# Patient Record
Sex: FEMALE | Race: WHITE | NOT HISPANIC OR LATINO | Employment: STUDENT | URBAN - METROPOLITAN AREA
[De-identification: names, ages, dates, MRNs, and addresses within clinical notes are randomized per-mention and may not be internally consistent; named-entity substitution may affect disease eponyms.]

---

## 2024-06-17 ENCOUNTER — TELEPHONE (OUTPATIENT)
Dept: PRIMARY CARE | Facility: CLINIC | Age: 19
End: 2024-06-17

## 2024-06-17 ENCOUNTER — APPOINTMENT (OUTPATIENT)
Dept: PRIMARY CARE | Facility: CLINIC | Age: 19
End: 2024-06-17
Payer: COMMERCIAL

## 2024-06-17 VITALS
HEIGHT: 62 IN | BODY MASS INDEX: 23.19 KG/M2 | WEIGHT: 126 LBS | OXYGEN SATURATION: 99 % | SYSTOLIC BLOOD PRESSURE: 118 MMHG | TEMPERATURE: 98 F | DIASTOLIC BLOOD PRESSURE: 74 MMHG | HEART RATE: 61 BPM

## 2024-06-17 DIAGNOSIS — J45.20 MILD INTERMITTENT ASTHMA WITHOUT COMPLICATION (HHS-HCC): ICD-10-CM

## 2024-06-17 DIAGNOSIS — S06.0X0A CONCUSSION WITHOUT LOSS OF CONSCIOUSNESS, INITIAL ENCOUNTER: Primary | ICD-10-CM

## 2024-06-17 DIAGNOSIS — G44.319 ACUTE POST-TRAUMATIC HEADACHE, NOT INTRACTABLE: ICD-10-CM

## 2024-06-17 PROCEDURE — 99204 OFFICE O/P NEW MOD 45 MIN: CPT | Performed by: INTERNAL MEDICINE

## 2024-06-17 PROCEDURE — 1036F TOBACCO NON-USER: CPT | Performed by: INTERNAL MEDICINE

## 2024-06-17 RX ORDER — SUMATRIPTAN 50 MG/1
50 TABLET, FILM COATED ORAL ONCE AS NEEDED
Qty: 27 TABLET | Refills: 3 | Status: SHIPPED | OUTPATIENT
Start: 2024-06-17 | End: 2025-06-17

## 2024-06-17 ASSESSMENT — ENCOUNTER SYMPTOMS
POLYDIPSIA: 0
NUMBNESS: 0
VOICE CHANGE: 0
PHOTOPHOBIA: 0
SINUS PAIN: 0
WOUND: 0
FREQUENCY: 0
UNEXPECTED WEIGHT CHANGE: 0
JOINT SWELLING: 0
APPETITE CHANGE: 0
FLANK PAIN: 0
BLOOD IN STOOL: 0
CONFUSION: 0
NECK PAIN: 0
ADENOPATHY: 0
COLOR CHANGE: 0
ACTIVITY CHANGE: 0
STRIDOR: 0
POLYPHAGIA: 0
DIZZINESS: 0
CHEST TIGHTNESS: 0
FACIAL ASYMMETRY: 0
BACK PAIN: 0
TROUBLE SWALLOWING: 0
VOMITING: 0
FEVER: 0
SLEEP DISTURBANCE: 0
EYE PAIN: 0
ABDOMINAL PAIN: 0
DYSURIA: 0
TREMORS: 0
SEIZURES: 0
HALLUCINATIONS: 0
PALPITATIONS: 0
SPEECH DIFFICULTY: 0
CONSTIPATION: 0
WHEEZING: 0
SHORTNESS OF BREATH: 0
HEADACHES: 0
MYALGIAS: 0
HEMATURIA: 0
WEAKNESS: 0
NERVOUS/ANXIOUS: 0
ARTHRALGIAS: 0
NAUSEA: 0
FATIGUE: 0
SORE THROAT: 0
COUGH: 0
DIARRHEA: 0

## 2024-06-17 NOTE — TELEPHONE ENCOUNTER
Pt's mom Janina called stating they went to pick prescription up but pharmacy only gave them 9 pills due to insurance coverage. Mom gave insurance a call and insurance is requiring a PA to get the rest of prescription.     Insurance PA number: [phone]  Mom Number: [phone]  (Pt's phone is not working have to contact mom)

## 2024-06-17 NOTE — PATIENT INSTRUCTIONS
Physical exam is not suggestive of Nystagmus or any other motor neurological deficit.  Patient has been given excuse until   DO NOT INVOLVE IN ANY CONTACT SPORTS. 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 BE CAREFUL WITH BUMPS ON THE ROADS.  Avoid sudden acceleration or deceleration as much as possible.  Do not work at height to avoid the risk of fall.  Avoid Alcohol/ Smoking/ Recreational drugs.  Avoid loud noises and bright fluctuating lights.     I will see you again in 4 weeks to see the fitness level to return back to work.

## 2024-06-17 NOTE — PROGRESS NOTES
Subjective   Patient ID: Sandra Pritchett is a 18 y.o. female who presents for Concussion (Hit head on cabinet at work. ).    HPI   Patient hit her head on the open cabinet while grabbing things and suddenly got up and hit her head on the open cabinet door. Incident happened couple of days ago and since then she had headache and sensitivity to loud noise, light etc.  She hit head on the flower pot hanging from the ceiling after the incident at her home.  No LOC either time. She did not get the CT scan of brain.    Her symptoms are improving gradually.    Review of Systems   Constitutional:  Negative for activity change, appetite change, fatigue, fever and unexpected weight change.   HENT:  Negative for dental problem, ear discharge, hearing loss, nosebleeds, postnasal drip, sinus pain, sore throat, trouble swallowing and voice change.    Eyes:  Negative for photophobia, pain and visual disturbance.   Respiratory:  Negative for cough, chest tightness, shortness of breath, wheezing and stridor.    Cardiovascular:  Negative for chest pain, palpitations and leg swelling.   Gastrointestinal:  Negative for abdominal pain, blood in stool, constipation, diarrhea, nausea and vomiting.   Endocrine: Negative for polydipsia, polyphagia and polyuria.   Genitourinary:  Negative for decreased urine volume, dyspareunia, dysuria, flank pain, frequency, hematuria and urgency.   Musculoskeletal:  Negative for arthralgias, back pain, gait problem, joint swelling, myalgias and neck pain.   Skin:  Negative for color change, rash and wound.   Allergic/Immunologic: Negative for environmental allergies and food allergies.   Neurological:  Negative for dizziness, tremors, seizures, syncope, facial asymmetry, speech difficulty, weakness, numbness and headaches.   Hematological:  Negative for adenopathy.   Psychiatric/Behavioral:  Negative for behavioral problems, confusion, hallucinations, self-injury, sleep disturbance and suicidal ideas. The  "patient is not nervous/anxious.      Objective   /74   Pulse 61   Temp 36.7 °C (98 °F)   Ht 1.575 m (5' 2\")   Wt 57.2 kg (126 lb)   SpO2 99%   BMI 23.05 kg/m²     Physical Exam  Vitals and nursing note reviewed.   Constitutional:       General: She is not in acute distress.     Appearance: Normal appearance. She is normal weight. She is not ill-appearing or toxic-appearing.   HENT:      Head: Normocephalic and atraumatic.      Nose: Nose normal.   Eyes:      Comments: She is wearing sun glasses to avoid light. I did not perform eye examination.    Cardiovascular:      Rate and Rhythm: Normal rate and regular rhythm.      Pulses: Normal pulses.      Heart sounds: Normal heart sounds. No murmur heard.     No gallop.   Pulmonary:      Effort: Pulmonary effort is normal. No respiratory distress.      Breath sounds: Normal breath sounds. No stridor. No wheezing or rales.   Musculoskeletal:         General: No swelling or deformity. Normal range of motion.      Cervical back: Normal range of motion and neck supple. No rigidity or tenderness.   Skin:     General: Skin is warm.      Coloration: Skin is not jaundiced.      Findings: No bruising, erythema or rash.   Neurological:      General: No focal deficit present.      Mental Status: She is alert and oriented to person, place, and time.      GCS: GCS eye subscore is 4. GCS verbal subscore is 5. GCS motor subscore is 6.      Cranial Nerves: No cranial nerve deficit, dysarthria or facial asymmetry.      Motor: Motor function is intact.      Coordination: Coordination is intact.      Gait: Gait normal.      Deep Tendon Reflexes:      Reflex Scores:       Bicep reflexes are 2+ on the right side and 2+ on the left side.       Brachioradialis reflexes are 2+ on the right side and 2+ on the left side.       Patellar reflexes are 2+ on the right side and 2+ on the left side.  Psychiatric:         Mood and Affect: Mood normal.         Behavior: Behavior normal.        "  Thought Content: Thought content normal.         Judgment: Judgment normal.       Assessment/Plan   Problem List Items Addressed This Visit          Neuro    Concussion with no loss of consciousness - Primary     Physical exam is not suggestive of Nystagmus or any other motor neurological deficit.  Patient has been given excuse until   DO NOT INVOLVE IN ANY CONTACT SPORTS. 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 BE CAREFUL WITH BUMPS ON THE ROADS.  Avoid sudden acceleration or deceleration as much as possible.  Do not work at height to avoid the risk of fall.  Avoid Alcohol/ Smoking/ Recreational drugs.  Avoid loud noises and bright fluctuating lights.     I will see you again in 4 weeks to see the fitness level to return back to work.            Pulmonary and Pneumonias    Mild intermittent asthma without complication ([facility])     Asymptomatic.  No wheezing on auscultation.          Other Visit Diagnoses       Acute post-traumatic headache, not intractable        Relevant Medications    SUMAtriptan (Imitrex) 50 mg tablet          RTC in 4 weeks for follow up. Concussion instruction has been provided to the patient.

## 2024-06-17 NOTE — LETTER
June 17, 2024     Patient: Sandra Pritchett   YOB: 2005   Date of Visit: 6/17/2024       To Whom It May Concern:    Sandra Pritchett was seen in my clinic on 6/17/2024 at 10:00 am. 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 Please appoint some other person to do this job for at least 4 weeks. It will going to exacerbate her medical problem.    If you have any questions or concerns, please don't hesitate to call.         Sincerely,         Monty Lacey MD        CC: No Recipients

## 2024-07-22 ENCOUNTER — APPOINTMENT (OUTPATIENT)
Dept: PRIMARY CARE | Facility: CLINIC | Age: 19
End: 2024-07-22
Payer: COMMERCIAL

## 2024-07-22 VITALS
WEIGHT: 130.4 LBS | SYSTOLIC BLOOD PRESSURE: 116 MMHG | HEART RATE: 87 BPM | TEMPERATURE: 98.2 F | OXYGEN SATURATION: 99 % | HEIGHT: 62 IN | DIASTOLIC BLOOD PRESSURE: 72 MMHG | RESPIRATION RATE: 16 BRPM | BODY MASS INDEX: 24 KG/M2

## 2024-07-22 DIAGNOSIS — G44.311 INTRACTABLE ACUTE POST-TRAUMATIC HEADACHE: Primary | ICD-10-CM

## 2024-07-22 PROCEDURE — 99213 OFFICE O/P EST LOW 20 MIN: CPT | Performed by: INTERNAL MEDICINE

## 2024-07-22 PROCEDURE — 1036F TOBACCO NON-USER: CPT | Performed by: INTERNAL MEDICINE

## 2024-07-22 PROCEDURE — 3008F BODY MASS INDEX DOCD: CPT | Performed by: INTERNAL MEDICINE

## 2024-07-22 RX ORDER — PROPRANOLOL HYDROCHLORIDE 20 MG/1
20 TABLET ORAL DAILY
Qty: 30 TABLET | Refills: 2 | Status: SHIPPED | OUTPATIENT
Start: 2024-07-22 | End: 2024-10-20

## 2024-07-22 RX ORDER — PROPRANOLOL HYDROCHLORIDE 20 MG/1
20 TABLET ORAL 2 TIMES DAILY
Qty: 60 TABLET | Refills: 5 | Status: SHIPPED | OUTPATIENT
Start: 2024-07-22 | End: 2024-07-22

## 2024-07-22 ASSESSMENT — ENCOUNTER SYMPTOMS
VOICE CHANGE: 0
VOMITING: 0
SHORTNESS OF BREATH: 0
POLYDIPSIA: 0
STRIDOR: 0
DIARRHEA: 0
TROUBLE SWALLOWING: 0
CHEST TIGHTNESS: 0
NECK PAIN: 0
JOINT SWELLING: 0
DYSURIA: 0
HEADACHES: 1
FLANK PAIN: 0
TREMORS: 0
NERVOUS/ANXIOUS: 0
MYALGIAS: 0
EYE PAIN: 0
WHEEZING: 0
SINUS PAIN: 0
ABDOMINAL PAIN: 0
POLYPHAGIA: 0
UNEXPECTED WEIGHT CHANGE: 0
HALLUCINATIONS: 0
NUMBNESS: 0
SEIZURES: 0
BACK PAIN: 0
ARTHRALGIAS: 0
SPEECH DIFFICULTY: 0
CONSTIPATION: 0
COUGH: 0
FEVER: 0
WOUND: 0
SLEEP DISTURBANCE: 0
ACTIVITY CHANGE: 0
APPETITE CHANGE: 0
SORE THROAT: 0
PHOTOPHOBIA: 0
HEMATURIA: 0
WEAKNESS: 0
FACIAL ASYMMETRY: 0
FREQUENCY: 0
PALPITATIONS: 0
DIZZINESS: 0
CONFUSION: 0
COLOR CHANGE: 0
NAUSEA: 0
BLOOD IN STOOL: 0
FATIGUE: 0
ADENOPATHY: 0

## 2024-07-22 NOTE — PROGRESS NOTES
Subjective   Patient ID: Sandra Pritchett is a 18 y.o. female who presents for Concussion (Hit head at work on a cabinet dizziness and blurry vision. Having trouble with memory ).    Concussion   Associated symptoms include headaches. Pertinent negatives include no numbness, vomiting or weakness.   Patient was given Sumatriptan for headache. She said I am hearing conflicting information about the medicine so she did not take the medicine and took only 1 dose.  As per her pharmacist at the [facility] asked not to take twice a week. Its possible it was told to her that insurance only cover 9 tablet and 2 tablets per week will be covered by the insurance.    Review of Systems   Constitutional:  Negative for activity change, appetite change, fatigue, fever and unexpected weight change.   HENT:  Negative for dental problem, ear discharge, hearing loss, nosebleeds, postnasal drip, sinus pain, sore throat, trouble swallowing and voice change.    Eyes:  Negative for photophobia, pain and visual disturbance.   Respiratory:  Negative for cough, chest tightness, shortness of breath, wheezing and stridor.    Cardiovascular:  Negative for chest pain, palpitations and leg swelling.   Gastrointestinal:  Negative for abdominal pain, blood in stool, constipation, diarrhea, nausea and vomiting.   Endocrine: Negative for polydipsia, polyphagia and polyuria.   Genitourinary:  Negative for decreased urine volume, dyspareunia, dysuria, flank pain, frequency, hematuria and urgency.   Musculoskeletal:  Negative for arthralgias, back pain, gait problem, joint swelling, myalgias and neck pain.   Skin:  Negative for color change, rash and wound.   Allergic/Immunologic: Negative for environmental allergies and food allergies.   Neurological:  Positive for headaches. Negative for dizziness, tremors, seizures, syncope, facial asymmetry, speech difficulty, weakness and numbness.   Hematological:  Negative for adenopathy.   Psychiatric/Behavioral:   "Negative for behavioral problems, confusion, hallucinations, self-injury, sleep disturbance and suicidal ideas. The patient is not nervous/anxious.      Objective   /72   Pulse 87   Temp 36.8 °C (98.2 °F) (Temporal)   Resp 16   Ht 1.575 m (5' 2\")   Wt 59.1 kg (130 lb 6.4 oz)   SpO2 99%   BMI 23.85 kg/m²     Physical Exam  Constitutional:       General: She is not in acute distress.     Appearance: She is not ill-appearing or toxic-appearing.   HENT:      Nose: Nose normal.   Eyes:      Conjunctiva/sclera: Conjunctivae normal.   Pulmonary:      Effort: Pulmonary effort is normal.   Neurological:      General: No focal deficit present.      Mental Status: She is alert and oriented to person, place, and time.   Psychiatric:         Mood and Affect: Mood normal.         Behavior: Behavior normal.       Assessment/Plan   Problem List Items Addressed This Visit    None  Visit Diagnoses       Intractable acute post-traumatic headache    -  Primary    Relevant Medications    propranolol (Inderal) 20 mg tablet        Patient did not take her medicine as advised. There is nothing much I can do here. From my side there is no acute change in the mental status and she can follow up with PCP and if has long term symptoms than Neurology can be considered. I am trying Propranolol for migraine prophylaxis.   "

## 2025-01-22 ENCOUNTER — OFFICE VISIT (OUTPATIENT)
Dept: URGENT CARE | Age: 20
End: 2025-01-22
Payer: COMMERCIAL

## 2025-01-22 VITALS
SYSTOLIC BLOOD PRESSURE: 135 MMHG | HEART RATE: 132 BPM | TEMPERATURE: 101.3 F | RESPIRATION RATE: 18 BRPM | DIASTOLIC BLOOD PRESSURE: 89 MMHG | OXYGEN SATURATION: 96 %

## 2025-01-22 DIAGNOSIS — J40 BRONCHITIS: Primary | ICD-10-CM

## 2025-01-22 RX ORDER — AZITHROMYCIN 250 MG/1
TABLET, FILM COATED ORAL
Qty: 6 TABLET | Refills: 0 | Status: SHIPPED | OUTPATIENT
Start: 2025-01-22 | End: 2025-01-27

## 2025-01-22 RX ORDER — ALBUTEROL SULFATE 90 UG/1
2 INHALANT RESPIRATORY (INHALATION) EVERY 4 HOURS PRN
Qty: 8.5 G | Refills: 0 | Status: SHIPPED | OUTPATIENT
Start: 2025-01-22 | End: 2026-01-22

## 2025-01-22 RX ORDER — PREDNISONE 20 MG/1
40 TABLET ORAL DAILY
Qty: 10 TABLET | Refills: 0 | Status: SHIPPED | OUTPATIENT
Start: 2025-01-22 | End: 2025-01-27

## 2025-01-22 ASSESSMENT — ENCOUNTER SYMPTOMS
PALPITATIONS: 0
DIARRHEA: 0
CHILLS: 1
FEVER: 1
WHEEZING: 1
CONSTIPATION: 0
COUGH: 1
TROUBLE SWALLOWING: 0
SHORTNESS OF BREATH: 0
RHINORRHEA: 0
SORE THROAT: 0
CHEST TIGHTNESS: 0
NAUSEA: 0

## 2025-01-22 NOTE — PROGRESS NOTES
Subjective   Patient ID: Sandra Pritchett is a 19 y.o. female. They present today with a chief complaint of Cough (Pt c/o persistent fatigue with increasingly severe cough and fever >1 week, pt c/o chest congestion and wet cough without sputum).    History of Present Illness  Patient presents with fever, coughing fits, productive cough, and wheezing. Claims upset stomach from coughing and side pain from forceful coughing fits. Denies diarrhea. Denies shortness of breath. Mom explains that patient had a history of asthma in childhood. Has used family members inhaler with some temp improvement of symptoms at home.           Past Medical History  Allergies as of 01/22/2025    (No Known Allergies)       (Not in a hospital admission)       No past medical history on file.    No past surgical history on file.     reports that she has never smoked. She has been exposed to tobacco smoke. She has never used smokeless tobacco. She reports that she does not drink alcohol and does not use drugs.    Review of Systems  Review of Systems   Constitutional:  Positive for chills and fever.   HENT:  Negative for congestion, ear pain, postnasal drip, rhinorrhea, sore throat and trouble swallowing.    Respiratory:  Positive for cough and wheezing. Negative for chest tightness and shortness of breath.    Cardiovascular:  Negative for palpitations.   Gastrointestinal:  Negative for constipation, diarrhea and nausea.   Skin:  Negative for rash.                                  Objective    Vitals:    01/22/25 1720   BP: 135/89   Pulse: (!) 132   Resp: 18   Temp: (!) 38.5 °C (101.3 °F)   SpO2: 96%     No LMP recorded.    Physical Exam  Constitutional:       General: She is not in acute distress.     Appearance: She is not toxic-appearing.   HENT:      Right Ear: Tympanic membrane and external ear normal.      Left Ear: Tympanic membrane and external ear normal.      Nose: No congestion.      Right Sinus: No frontal sinus tenderness.       Left Sinus: No frontal sinus tenderness.      Mouth/Throat:      Mouth: Mucous membranes are moist. No oral lesions.      Pharynx: Postnasal drip present. No oropharyngeal exudate or posterior oropharyngeal erythema.   Eyes:      Pupils: Pupils are equal, round, and reactive to light.   Cardiovascular:      Rate and Rhythm: Normal rate and regular rhythm.   Pulmonary:      Effort: Pulmonary effort is normal. No accessory muscle usage or respiratory distress.      Breath sounds: Examination of the right-upper field reveals wheezing. Examination of the left-upper field reveals wheezing. Examination of the right-middle field reveals wheezing. Examination of the left-middle field reveals wheezing. Wheezing present.      Comments: Mild expiratory wheeze noted b/l, diffuse. Cleared without coughing fit during examination. Otherwise normal resp effort at rest.   Musculoskeletal:      Cervical back: Normal range of motion.   Neurological:      Mental Status: She is alert.         Procedures    Point of Care Test & Imaging Results from this visit  No results found for this visit on 01/22/25.   No results found.    Diagnostic study results (if any) were reviewed by Elvira Salcido PA-C.    Assessment/Plan   Allergies, medications, history, and pertinent labs/EKGs/Imaging reviewed by Elvira Salcido PA-C.     Medical Decision Making  MDM- History and exam consistent with acute bronchitis. No evidence of pneumonia, sepsis or other acute cardiopulmonary pathology but has past lung disease. Based on current exam I don't feel that imaging, labs, or further work up are warranted at this point. Based on current exam and past medical history, plan is for antibiotics and symptomatic therapies. Patient advised to return to clinic or go to the ED if symptoms change or worsen. Patient verbalized understanding and agrees with plan.       Orders and Diagnoses  Diagnoses and all orders for this visit:  Bronchitis  -     azithromycin  (Zithromax) 250 mg tablet; Take 2 tabs (500 mg) by mouth today, than 1 daily for 4 days.  -     predniSONE (Deltasone) 20 mg tablet; Take 2 tablets (40 mg) by mouth once daily for 5 days.  -     albuterol (ProAir HFA) 90 mcg/actuation inhaler; Inhale 2 puffs every 4 hours if needed for wheezing or shortness of breath.      Medical Admin Record      Patient disposition: Home    Electronically signed by Elvira Salcido PA-C  5:28 PM

## 2025-01-22 NOTE — PATIENT INSTRUCTIONS
Recommended Salt water gargles, hot tea and honey, tylenol/ibuprofen,    Keep treating fever with tylenol ibuprofen    Recommended mucinex DM over the counter.     Follow up with pcp.      If you develop chest pain, shortness of breath, or fever not reduced with tylenol/ibuprofen go to ER

## 2025-01-22 NOTE — LETTER
January 22, 2025     Patient: Sandra Pritchett   YOB: 2005   Date of Visit: 1/22/2025       To Whom It May Concern:    Sandra Pritchett was seen in my clinic on 1/22/2025 at 5:10 pm. Please excuse Sandra for her absence from work. May return when symptoms improved and fever free for 24-48 hours without use of tylenol/ibuprofen.          Sincerely,         Elvira Salcido PA-C        CC: No Recipients

## 2025-03-07 ENCOUNTER — OFFICE VISIT (OUTPATIENT)
Dept: PRIMARY CARE | Facility: CLINIC | Age: 20
End: 2025-03-07
Payer: COMMERCIAL

## 2025-03-07 VITALS
OXYGEN SATURATION: 99 % | TEMPERATURE: 97.3 F | SYSTOLIC BLOOD PRESSURE: 116 MMHG | BODY MASS INDEX: 25.21 KG/M2 | HEIGHT: 62 IN | HEART RATE: 78 BPM | WEIGHT: 137 LBS | DIASTOLIC BLOOD PRESSURE: 70 MMHG

## 2025-03-07 DIAGNOSIS — G43.009 MIGRAINE WITHOUT AURA AND WITHOUT STATUS MIGRAINOSUS, NOT INTRACTABLE: Primary | ICD-10-CM

## 2025-03-07 PROCEDURE — 3008F BODY MASS INDEX DOCD: CPT | Performed by: INTERNAL MEDICINE

## 2025-03-07 PROCEDURE — 99213 OFFICE O/P EST LOW 20 MIN: CPT | Performed by: INTERNAL MEDICINE

## 2025-03-07 PROCEDURE — 1036F TOBACCO NON-USER: CPT | Performed by: INTERNAL MEDICINE

## 2025-03-07 NOTE — PROGRESS NOTES
"Subjective   Patient ID: Sandra Pritchett is a 19 y.o. female who presents for Migraine (Chronic migraines).    HPI   Patient migraine is still present.  She had good response with Imitrex but insurance covers only 9 tablets a month and she is willing to try new medicine.    Review of Systems   Constitutional:  Negative for activity change, appetite change, fatigue, fever and unexpected weight change.   HENT:  Negative for ear discharge, hearing loss, nosebleeds, postnasal drip, sinus pain, sore throat, trouble swallowing and voice change.    Eyes:  Negative for photophobia, pain and visual disturbance.   Respiratory:  Negative for cough, chest tightness, shortness of breath, wheezing and stridor.    Cardiovascular:  Negative for chest pain, palpitations and leg swelling.   Gastrointestinal:  Negative for abdominal pain, blood in stool, constipation, diarrhea and nausea.   Endocrine: Negative for polyuria.   Genitourinary:  Negative for decreased urine volume, dyspareunia, dysuria, flank pain, hematuria and urgency.   Musculoskeletal:  Negative for arthralgias, back pain, gait problem, joint swelling, myalgias and neck pain.   Skin:  Negative for color change and rash.   Allergic/Immunologic: Negative for environmental allergies and food allergies.   Neurological:  Positive for headaches. Negative for dizziness, tremors, seizures, syncope, weakness and numbness.   Hematological:  Negative for adenopathy.   Psychiatric/Behavioral:  Negative for sleep disturbance. The patient is not nervous/anxious.      Objective   /70   Pulse 78   Temp 36.3 °C (97.3 °F)   Ht 1.575 m (5' 2\")   Wt 62.1 kg (137 lb)   SpO2 99%   BMI 25.06 kg/m²     Physical Exam  Vitals and nursing note reviewed.   Constitutional:       General: She is not in acute distress.     Appearance: Normal appearance. She is not ill-appearing or toxic-appearing.   HENT:      Head: Normocephalic.      Nose: Nose normal. No congestion.   Eyes:      " Conjunctiva/sclera: Conjunctivae normal.   Pulmonary:      Effort: Pulmonary effort is normal.   Musculoskeletal:         General: No deformity. Normal range of motion.   Neurological:      General: No focal deficit present.      Mental Status: She is alert and oriented to person, place, and time.   Psychiatric:         Mood and Affect: Mood normal.         Behavior: Behavior normal.       Assessment/Plan   Problem List Items Addressed This Visit    None  Visit Diagnoses       Migraine without aura and without status migrainosus, not intractable    -  Primary    Relevant Medications    rimegepant (NURTEC) 75 mg tablet,disintegrating           I have personally seen and examined this patient.  I have fully participated in the care of this patient. I have reviewed all pertinent clinical information, including history, physical exam, plan and the Resident’s note and agree except as noted.

## 2025-03-09 ASSESSMENT — ENCOUNTER SYMPTOMS
COLOR CHANGE: 0
BACK PAIN: 0
TROUBLE SWALLOWING: 0
SORE THROAT: 0
SINUS PAIN: 0
ACTIVITY CHANGE: 0
CHEST TIGHTNESS: 0
DIZZINESS: 0
JOINT SWELLING: 0
FATIGUE: 0
NECK PAIN: 0
EYE PAIN: 0
PHOTOPHOBIA: 0
SLEEP DISTURBANCE: 0
BLOOD IN STOOL: 0
SHORTNESS OF BREATH: 0
ABDOMINAL PAIN: 0
ADENOPATHY: 0
DYSURIA: 0
FEVER: 0
NAUSEA: 0
DIARRHEA: 0
ARTHRALGIAS: 0
MYALGIAS: 0
HEMATURIA: 0
NUMBNESS: 0
PALPITATIONS: 0
WHEEZING: 0
UNEXPECTED WEIGHT CHANGE: 0
CONSTIPATION: 0
HEADACHES: 1
APPETITE CHANGE: 0
VOICE CHANGE: 0
NERVOUS/ANXIOUS: 0
WEAKNESS: 0
TREMORS: 0
STRIDOR: 0
COUGH: 0
SEIZURES: 0
FLANK PAIN: 0

## 2025-04-01 ENCOUNTER — APPOINTMENT (OUTPATIENT)
Dept: PRIMARY CARE | Facility: CLINIC | Age: 20
End: 2025-04-01
Payer: COMMERCIAL

## 2025-04-17 ENCOUNTER — TELEPHONE (OUTPATIENT)
Dept: PRIMARY CARE | Facility: CLINIC | Age: 20
End: 2025-04-17

## 2025-04-17 DIAGNOSIS — G43.009 MIGRAINE WITHOUT AURA AND WITHOUT STATUS MIGRAINOSUS, NOT INTRACTABLE: Primary | ICD-10-CM

## 2025-04-17 DIAGNOSIS — G43.009 MIGRAINE WITHOUT AURA AND WITHOUT STATUS MIGRAINOSUS, NOT INTRACTABLE: ICD-10-CM

## 2025-04-17 NOTE — PROGRESS NOTES
"Subjective   Patient ID: Sandra Pritchett \"Masha" is a 19 y.o. female who presents for New Patient Visit and Annual Exam.  Today she is accompanied by alone.     HPI  New patient visit/ Health maintenance  Sandra presents today as a new patient,  Overall patient is doing well, but admits of having some concerns as noted below.   Immunization: Tdap 2014  Influenza vaccine declined   COVID-19 vaccine (Pfizer Moderna) 3 doses:  Colon Cancer Screening: No family history  OB/GYN: Pap smear is due  Diet: Regular diet  Exercise: Plays softball  Tobacco: Denies use  EtOH: Rarely Socially     Other problems/diagnoses addressed and reviewed during this encounter    2. Migraine  Has history of postconcussion headaches, started in June 2024.  Her headaches are associated with aura, visual disturbance and frequency is inconsistent, varying from every day to period of weeks without any headaches.  Most recently while she is on prophylactic Nurtec 75 mg, she has been experiencing those headaches every other day.  In the past has used Imitrex and propranolol  This medication is managed by Dr. Lacey.  She has not followed with neurology in the past and is asking for referral    3. Asthma  He has a history of mild exertional asthma  Uses albuterol prior to exertion  Denies any wheezing or shortness of breath    4.  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  Admits that her sleep pattern is not the greatest that sometimes she experiencing problem falling asleep and staying asleep.  Also has noticed that she is more irritable recently and feels worried most of the time.  At times she has palpitation and feels like her heart is racing.  Complain that her mind is racing and it is harder for her to relax.      Medications Ordered Prior to Encounter[1]     Allergies[2]    Immunization History   Administered Date(s) Administered    COVID-19, mRNA, LNP-S, PF, 30 " "mcg/0.3 mL dose 07/25/2021, 08/16/2021    DTP 11/19/2004    DTaP, Unspecified 2003, 2003, 02/06/2004, 08/19/2008    Flu vaccine (IIV4), preservative free *Check age/dose* 10/02/2017, 12/28/2018, 11/25/2019    HPV, Quadrivalent 08/07/2014, 10/08/2014    HPV, Unspecified 02/16/2015    Hepatitis A vaccine, pediatric/adolescent (HAVRIX, VAQTA) 09/08/2011, 04/11/2012    Hepatitis B vaccine, 19 yrs and under (RECOMBIVAX, ENGERIX) 2003, 2003, 2003, 02/06/2004    Hib (HbOC) 2003, 2003, 02/06/2004, 09/11/2007    Influenza, seasonal, injectable 12/11/2007    MMR vaccine, subcutaneous (MMR II) 08/02/2004, 08/19/2008    Meningococcal ACWY-D (Menactra) 4-valent conjugate vaccine 08/07/2014, 08/09/2019    Meningococcal B vaccine (BEXSERO) 08/20/2021, 11/24/2021    Pfizer Gray Cap SARS-CoV-2 01/28/2022    Pneumococcal Conjugate PCV 7 2003, 2003, 02/06/2004, 11/19/2004    Pneumococcal, Unspecified 11/19/2004    Poliovirus vaccine, subcutaneous (IPOL) 2003, 2003, 02/06/2004, 08/19/2008    Tdap vaccine, age 7 year and older (BOOSTRIX, ADACEL) 08/11/2014, 04/21/2025    Varicella vaccine, subcutaneous (VARIVAX) 08/02/2004, 08/19/2008         Review of Systems  All pertinent positive symptoms are included in the history of present illness.  All other systems have been reviewed and are negative and noncontributory to this patient's current ailments.     Objective   /70 (BP Location: Left arm, Patient Position: Sitting, BP Cuff Size: Adult)   Pulse 85   Ht 1.575 m (5' 2\")   Wt 63 kg (138 lb 12.8 oz)   SpO2 100%   BMI 25.39 kg/m²   BSA: 1.66 meters squared  No visits with results within 1 Month(s) from this visit.   Latest known visit with results is:   [facility] Encounter on 03/03/2022   Component Date Value Ref Range Status    Ventricular Rate 03/03/2022 61  BPM Final    Atrial Rate 03/03/2022 61  BPM Final    FL Interval 03/03/2022 142  ms Final    QRS Duration " 03/03/2022 78  ms Final    QT Interval 03/03/2022 388  ms Final    QTC Calculation(Bazett) 03/03/2022 390  ms Final    P Axis 03/03/2022 51  degrees Final    R Axis 03/03/2022 66  degrees Final    T Axis 03/03/2022 43  degrees Final    QRS Count 03/03/2022 10  beats Final    Q Onset 03/03/2022 220  ms Final    P Onset 03/03/2022 149  ms Final    P Offset 03/03/2022 195  ms Final    T Offset 03/03/2022 414  ms Final    QTC Fredericia 03/03/2022 389  ms Final       Physical Exam  Constitutional:       General: She is not in acute distress.     Appearance: Normal appearance. She is normal weight. She is not ill-appearing.   HENT:      Head: Normocephalic and atraumatic.      Right Ear: External ear normal.      Left Ear: External ear normal.      Nose: Nose normal. No congestion or rhinorrhea.      Mouth/Throat:      Mouth: Mucous membranes are moist.      Pharynx: Oropharynx is clear. No oropharyngeal exudate or posterior oropharyngeal erythema.   Eyes:      General: No scleral icterus.     Extraocular Movements: Extraocular movements intact.      Conjunctiva/sclera: Conjunctivae normal.      Pupils: Pupils are equal, round, and reactive to light.   Cardiovascular:      Rate and Rhythm: Normal rate and regular rhythm.      Pulses: Normal pulses.      Heart sounds: Normal heart sounds. No murmur heard.  Pulmonary:      Effort: Pulmonary effort is normal. No respiratory distress.      Breath sounds: Normal breath sounds. No wheezing, rhonchi or rales.   Abdominal:      General: Abdomen is flat. Bowel sounds are normal.      Palpations: Abdomen is soft.      Tenderness: There is no abdominal tenderness. There is no guarding or rebound.   Musculoskeletal:         General: No deformity. Normal range of motion.      Right lower leg: No edema.      Left lower leg: No edema.   Skin:     General: Skin is warm and dry.      Capillary Refill: Capillary refill takes less than 2 seconds.      Findings: No lesion or rash.    Neurological:      General: No focal deficit present.      Mental Status: She is alert and oriented to person, place, and time. Mental status is at baseline.   Psychiatric:         Mood and Affect: Mood normal.         Behavior: Behavior normal.         Thought Content: Thought content normal.         Judgment: Judgment normal.      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  We discussed the benefits and side effects of the immunization  All questions were answered and the vaccine was administered.     Migraine without aura and without status migrainosus, not intractable  -     Referral to Neurology; Future  -     venlafaxine XR (Effexor-XR) 37.5 mg 24 hr capsule; Take 1 capsule (37.5 mg) by mouth once daily. Do not crush or chew.    Mild intermittent asthma without complication ([facility])  -     albuterol (ProAir HFA) 90 mcg/actuation inhaler; Inhale 2 puffs every 4 hours if needed for wheezing or shortness of breath.    Anxiety  -     hydrOXYzine pamoate (VistariL) 25 mg capsule; Take 1 capsule (25 mg) by mouth 3 times a day as needed for itching for up to 10 days.  -     venlafaxine XR (Effexor-XR) 37.5 mg 24 hr capsule; Take 1 capsule  (37.5 mg) by mouth once daily. Do not crush or chew.    Screening for thyroid disorder  -     TSH with reflex to Free T4 if abnormal; Future  Screening for cardiovascular condition  -     Lipid Panel; Future  Screening for blood disease  -     CBC and Auto Differential; Future  Screening for diabetes mellitus  -     Hemoglobin A1C; Future  Screening for tuberculosis  -     T-Spot TB; Future      Thank you for letting us be a part of your care team.  Please call the office if you have further questions or concerns regarding your care    Otherwise, please follow-up in 6 weeks for continued care and refills     Felicitas Mondragon MD  PGY2, FM Resident        [1]   Current Outpatient Medications on File Prior to Visit   Medication Sig Dispense Refill    rimegepant (NURTEC) 75 mg tablet,disintegrating Dissolve 1 tablet (75 mg) in the mouth every other day. 15 tablet 1    [DISCONTINUED] albuterol (ProAir HFA) 90 mcg/actuation inhaler Inhale 2 puffs every 4 hours if needed for wheezing or shortness of breath. 8.5 g 0    [DISCONTINUED] propranolol (Inderal) 20 mg tablet Take 1 tablet (20 mg) by mouth once daily. 30 tablet 2    [DISCONTINUED] SUMAtriptan (Imitrex) 50 mg tablet Take 1 tablet (50 mg) by mouth 1 time if needed for migraine. May repeat after 2 hours. 27 tablet 3     No current facility-administered medications on file prior to visit.   [2] No Known Allergies

## 2025-04-17 NOTE — TELEPHONE ENCOUNTER
Pt and pharmacy has called multiple time for refill. Are you able to send in?  Rimegepant to [facility]

## 2025-04-19 DIAGNOSIS — G43.009 MIGRAINE WITHOUT AURA AND WITHOUT STATUS MIGRAINOSUS, NOT INTRACTABLE: ICD-10-CM

## 2025-04-21 ENCOUNTER — APPOINTMENT (OUTPATIENT)
Dept: PRIMARY CARE | Facility: CLINIC | Age: 20
End: 2025-04-21
Payer: COMMERCIAL

## 2025-04-21 VITALS
HEIGHT: 62 IN | SYSTOLIC BLOOD PRESSURE: 128 MMHG | WEIGHT: 138.8 LBS | DIASTOLIC BLOOD PRESSURE: 70 MMHG | OXYGEN SATURATION: 100 % | HEART RATE: 85 BPM | BODY MASS INDEX: 25.54 KG/M2

## 2025-04-21 DIAGNOSIS — Z13.0 SCREENING FOR BLOOD DISEASE: ICD-10-CM

## 2025-04-21 DIAGNOSIS — Z00.00 HEALTHCARE MAINTENANCE: Primary | ICD-10-CM

## 2025-04-21 DIAGNOSIS — Z13.6 SCREENING FOR CARDIOVASCULAR CONDITION: ICD-10-CM

## 2025-04-21 DIAGNOSIS — G43.009 MIGRAINE WITHOUT AURA AND WITHOUT STATUS MIGRAINOSUS, NOT INTRACTABLE: ICD-10-CM

## 2025-04-21 DIAGNOSIS — F41.9 ANXIETY: ICD-10-CM

## 2025-04-21 DIAGNOSIS — Z23 NEED FOR TDAP VACCINATION: ICD-10-CM

## 2025-04-21 DIAGNOSIS — J40 BRONCHITIS: ICD-10-CM

## 2025-04-21 DIAGNOSIS — Z13.29 SCREENING FOR THYROID DISORDER: ICD-10-CM

## 2025-04-21 DIAGNOSIS — Z13.1 SCREENING FOR DIABETES MELLITUS: ICD-10-CM

## 2025-04-21 DIAGNOSIS — J45.20 MILD INTERMITTENT ASTHMA WITHOUT COMPLICATION (HHS-HCC): ICD-10-CM

## 2025-04-21 DIAGNOSIS — Z11.1 SCREENING FOR TUBERCULOSIS: ICD-10-CM

## 2025-04-21 PROCEDURE — 90715 TDAP VACCINE 7 YRS/> IM: CPT | Performed by: STUDENT IN AN ORGANIZED HEALTH CARE EDUCATION/TRAINING PROGRAM

## 2025-04-21 PROCEDURE — 1036F TOBACCO NON-USER: CPT | Performed by: STUDENT IN AN ORGANIZED HEALTH CARE EDUCATION/TRAINING PROGRAM

## 2025-04-21 PROCEDURE — 99214 OFFICE O/P EST MOD 30 MIN: CPT | Performed by: STUDENT IN AN ORGANIZED HEALTH CARE EDUCATION/TRAINING PROGRAM

## 2025-04-21 PROCEDURE — 90471 IMMUNIZATION ADMIN: CPT | Performed by: STUDENT IN AN ORGANIZED HEALTH CARE EDUCATION/TRAINING PROGRAM

## 2025-04-21 PROCEDURE — 99395 PREV VISIT EST AGE 18-39: CPT | Performed by: STUDENT IN AN ORGANIZED HEALTH CARE EDUCATION/TRAINING PROGRAM

## 2025-04-21 PROCEDURE — 3008F BODY MASS INDEX DOCD: CPT | Performed by: STUDENT IN AN ORGANIZED HEALTH CARE EDUCATION/TRAINING PROGRAM

## 2025-04-21 RX ORDER — VENLAFAXINE HYDROCHLORIDE 37.5 MG/1
37.5 CAPSULE, EXTENDED RELEASE ORAL DAILY
Qty: 30 CAPSULE | Refills: 1 | Status: SHIPPED | OUTPATIENT
Start: 2025-04-21 | End: 2025-06-20

## 2025-04-21 RX ORDER — HYDROXYZINE PAMOATE 25 MG/1
25 CAPSULE ORAL 3 TIMES DAILY PRN
Qty: 30 CAPSULE | Refills: 1 | Status: SHIPPED | OUTPATIENT
Start: 2025-04-21 | End: 2025-05-01

## 2025-04-21 RX ORDER — ALBUTEROL SULFATE 90 UG/1
2 INHALANT RESPIRATORY (INHALATION) EVERY 4 HOURS PRN
Qty: 8.5 G | Refills: 1 | Status: SHIPPED | OUTPATIENT
Start: 2025-04-21 | End: 2026-04-21

## 2025-04-21 RX ORDER — CITALOPRAM 10 MG/1
TABLET ORAL
Qty: 30 TABLET | Refills: 1 | Status: CANCELLED | OUTPATIENT
Start: 2025-04-21 | End: 2025-06-27

## 2025-04-22 RX ORDER — RIMEGEPANT SULFATE 75 MG/75MG
TABLET, ORALLY DISINTEGRATING ORAL
Qty: 15 TABLET | Refills: 0 | Status: SHIPPED | OUTPATIENT
Start: 2025-04-22

## 2025-05-13 ENCOUNTER — ANCILLARY PROCEDURE (OUTPATIENT)
Dept: URGENT CARE | Age: 20
End: 2025-05-13
Payer: COMMERCIAL

## 2025-05-13 ENCOUNTER — OFFICE VISIT (OUTPATIENT)
Dept: URGENT CARE | Age: 20
End: 2025-05-13
Payer: COMMERCIAL

## 2025-05-13 VITALS
HEART RATE: 70 BPM | OXYGEN SATURATION: 96 % | TEMPERATURE: 98.5 F | BODY MASS INDEX: 23.92 KG/M2 | DIASTOLIC BLOOD PRESSURE: 88 MMHG | SYSTOLIC BLOOD PRESSURE: 130 MMHG | RESPIRATION RATE: 18 BRPM | HEIGHT: 62 IN | WEIGHT: 130 LBS

## 2025-05-13 DIAGNOSIS — M25.572 LEFT ANKLE PAIN, UNSPECIFIED CHRONICITY: ICD-10-CM

## 2025-05-13 DIAGNOSIS — S93.402A SPRAIN OF LEFT ANKLE, UNSPECIFIED LIGAMENT, INITIAL ENCOUNTER: Primary | ICD-10-CM

## 2025-05-13 PROCEDURE — 1036F TOBACCO NON-USER: CPT | Performed by: PERSONAL EMERGENCY RESPONSE ATTENDANT

## 2025-05-13 PROCEDURE — 99213 OFFICE O/P EST LOW 20 MIN: CPT | Performed by: PERSONAL EMERGENCY RESPONSE ATTENDANT

## 2025-05-13 PROCEDURE — 3008F BODY MASS INDEX DOCD: CPT | Performed by: PERSONAL EMERGENCY RESPONSE ATTENDANT

## 2025-05-13 PROCEDURE — 73610 X-RAY EXAM OF ANKLE: CPT | Mod: LEFT SIDE | Performed by: PERSONAL EMERGENCY RESPONSE ATTENDANT

## 2025-05-13 ASSESSMENT — ENCOUNTER SYMPTOMS
GASTROINTESTINAL NEGATIVE: 1
PSYCHIATRIC NEGATIVE: 1
CONSTITUTIONAL NEGATIVE: 1
CARDIOVASCULAR NEGATIVE: 1
ARTHRALGIAS: 1
RESPIRATORY NEGATIVE: 1

## 2025-05-13 NOTE — PROGRESS NOTES
"Subjective   Patient ID: Sandra Pritchett \"Masha" is a 19 y.o. female. They present today with a chief complaint of Ankle Injury (Ankle pain after softball game on Thursday.).    History of Present Illness  19-year-old female who comes in today with a chief complaint of left ankle pain.  Patient stated that she had pain after playing softball approximately 5 days ago.  She did roll her ankle and has pain on the lateral aspect.  She stated that it is painful to walk on, but is not sure if it is broken and came in for x-rays for evaluation.  She describes the pain as a dull/achy sensation that is worse with walking.  She has not taken any over-the-counter medications.  She denies any other injury.          Past Medical History  Allergies as of 05/13/2025    (No Known Allergies)       Prescriptions Prior to Admission[1]     Medical History[2]    Surgical History[3]     reports that she has never smoked. She has been exposed to tobacco smoke. She has never used smokeless tobacco. She reports that she does not drink alcohol and does not use drugs.    Review of Systems  Review of Systems   Constitutional: Negative.    HENT: Negative.     Respiratory: Negative.     Cardiovascular: Negative.    Gastrointestinal: Negative.    Genitourinary: Negative.    Musculoskeletal:  Positive for arthralgias.   Psychiatric/Behavioral: Negative.     All other systems reviewed and are negative.                                 Objective    Vitals:    05/13/25 1916   BP: 130/88   BP Location: Right arm   Patient Position: Sitting   BP Cuff Size: Adult   Pulse: 70   Resp: 18   Temp: 36.9 °C (98.5 °F)   TempSrc: Oral   SpO2: 96%   Weight: 59 kg (130 lb)   Height: 1.575 m (5' 2\")     Patient's last menstrual period was 04/25/2025.    Physical Exam  Vitals and nursing note reviewed.   Constitutional:       Appearance: Normal appearance.   HENT:      Head: Normocephalic and atraumatic.   Cardiovascular:      Rate and Rhythm: Normal rate. "   Pulmonary:      Effort: Pulmonary effort is normal.   Musculoskeletal:      Right ankle: Normal.      Left ankle: No swelling, deformity, ecchymosis or lacerations. Tenderness present over the lateral malleolus. Normal range of motion.      Comments: Patient does have mild tenderness over the lateral malleolus without any evidence of deformity, swelling or ecchymosis.   Neurological:      General: No focal deficit present.      Mental Status: She is alert and oriented to person, place, and time.         Procedures    Point of Care Test & Imaging Results from this visit  No results found for this visit on 05/13/25.   Imaging  No results found.    Cardiology, Vascular, and Other Imaging  No other imaging results found for the past 2 days      Diagnostic study results (if any) were reviewed by Glenn Looney PA-C.    Assessment/Plan   Allergies, medications, history, and pertinent labs/EKGs/Imaging reviewed by Glenn Looney PA-C.     Medical Decision Making  19-year-old female comes in today with a chief complaint of left ankle pain after twisting her ankle while playing softball approximately 5 days ago.  She states that it is painful to walk on and describes it as a dull/achy sensation.  She came in for x-rays today.  X-rays of left ankle revealed no acute bony abnormality.  Patient was advised to take Advil and other over-the-counter medication for pain.  I did offer her an Ace wrap, crutches and a brace.  She did say that she had a brace at home that she could use.  Patient is stable for discharge and request to go home.  Discharge instructions be given.  She is advised to follow-up with orthopedics in the next 5 to 7 days    Orders and Diagnoses  There are no diagnoses linked to this encounter.    Medical Admin Record      Patient disposition: Home    Electronically signed by Glenn Looney PA-C  7:34 PM           [1] (Not in a hospital admission)  [2]   Past Medical History:  Diagnosis Date    Headache     [3] No past surgical history on file.

## 2025-05-19 ENCOUNTER — APPOINTMENT (OUTPATIENT)
Dept: PRIMARY CARE | Facility: CLINIC | Age: 20
End: 2025-05-19
Payer: COMMERCIAL

## 2025-05-19 VITALS
WEIGHT: 140 LBS | HEART RATE: 98 BPM | DIASTOLIC BLOOD PRESSURE: 68 MMHG | OXYGEN SATURATION: 100 % | SYSTOLIC BLOOD PRESSURE: 106 MMHG | BODY MASS INDEX: 25.61 KG/M2

## 2025-05-19 DIAGNOSIS — F41.9 ANXIETY: Primary | ICD-10-CM

## 2025-05-19 PROBLEM — G43.009 MIGRAINE WITHOUT AURA AND WITHOUT STATUS MIGRAINOSUS, NOT INTRACTABLE: Status: ACTIVE | Noted: 2025-05-19

## 2025-05-19 PROCEDURE — 1036F TOBACCO NON-USER: CPT | Performed by: STUDENT IN AN ORGANIZED HEALTH CARE EDUCATION/TRAINING PROGRAM

## 2025-05-19 PROCEDURE — 99214 OFFICE O/P EST MOD 30 MIN: CPT | Performed by: STUDENT IN AN ORGANIZED HEALTH CARE EDUCATION/TRAINING PROGRAM

## 2025-05-19 RX ORDER — VENLAFAXINE HYDROCHLORIDE 75 MG/1
75 CAPSULE, EXTENDED RELEASE ORAL DAILY
Qty: 30 CAPSULE | Refills: 1 | Status: SHIPPED | OUTPATIENT
Start: 2025-05-19 | End: 2025-07-18

## 2025-05-19 NOTE — PROGRESS NOTES
Subjective   Patient ID: Mei Pritchett is a 19 y.o. female who presents for Follow-up.    HPI   Anxiety  Patient presented today in office as a follow-up for her anxiety.  4 weeks ago she was started on Effexor 37.5 mg daily alongside with Vistaril 25 mg 3 times daily as needed.  At today's visit she admits that her anxiety initially improved, but she feels that her medication are not helping more recently.  Continues to have some problem with sleep, and occasional migraine headaches.  She has an upcoming appointment with neurology  Stated that Vistaril is somehow helping but she is not using that very often.    Review of Systems  All pertinent positive symptoms are included in the history of present illness.    All other systems have been reviewed and are negative and noncontributory to this patient's current ailments.    Objective   /68 (BP Location: Left arm, Patient Position: Sitting, BP Cuff Size: Adult)   Pulse 98   Wt 63.5 kg (140 lb)   LMP 04/25/2025   SpO2 100%   BMI 25.61 kg/m²     Physical Exam  CONSTITUTIONAL - well nourished, well developed, looks like stated age, in no acute distress, not ill-appearing, and not tired appearing  SKIN - normal skin color and pigmentation, normal skin turgor without rash, lesions, or nodules visualized  HEAD - no trauma, normocephalic  EYES - normal external exam  CHEST -no distressed breathing, good effort  EXTREMITIES - no edema, no deformities  NEUROLOGICAL - normal balance, normal motor, no ataxia  PSYCHIATRIC - alert, pleasant and cordial, age-appropriate    Assessment/Plan   Diagnoses and all orders for this visit:  Anxiety  -     venlafaxine XR (Effexor-XR) 75 mg 24 hr capsule; Take 1 capsule (75 mg) by mouth once daily. Do not crush or chew.  Her anxiety is not well-controlled at this moment  Had a discussion and agreed to increase the dose of Effexor XR to 75 mg daily.  Patient was instructed to continue using Vistaril 25 mg as needed.  Also she will  monitor her symptoms and return to the office for reevaluation in 6 weeks.  Encouraged to follow-up with neurology for her migraine headaches.    Thank you for letting us be a part of your care team.  Please call the office if you have further questions or concerns regarding your care    Felicitas Mondragon MD  PGY2,  Resident

## 2025-05-22 LAB
ALBUMIN SERPL-MCNC: 4.6 G/DL (ref 3.6–5.1)
ALP SERPL-CCNC: 61 U/L (ref 31–125)
ALT SERPL-CCNC: 13 U/L (ref 6–29)
ANION GAP SERPL CALCULATED.4IONS-SCNC: 8 MMOL/L (CALC) (ref 7–17)
AST SERPL-CCNC: 19 U/L (ref 10–30)
BASOPHILS # BLD AUTO: 59 CELLS/UL (ref 0–200)
BASOPHILS NFR BLD AUTO: 0.8 %
BILIRUB SERPL-MCNC: 0.3 MG/DL (ref 0.2–1.2)
BUN SERPL-MCNC: 11 MG/DL (ref 7–25)
CALCIUM SERPL-MCNC: 9.1 MG/DL (ref 8.6–10.2)
CHLORIDE SERPL-SCNC: 104 MMOL/L (ref 98–110)
CHOLEST SERPL-MCNC: 178 MG/DL
CHOLEST/HDLC SERPL: 3.3 (CALC)
CO2 SERPL-SCNC: 26 MMOL/L (ref 20–32)
CREAT SERPL-MCNC: 0.63 MG/DL (ref 0.5–0.96)
EGFRCR SERPLBLD CKD-EPI 2021: 129 ML/MIN/1.73M2
EOSINOPHIL # BLD AUTO: 274 CELLS/UL (ref 15–500)
EOSINOPHIL NFR BLD AUTO: 3.7 %
ERYTHROCYTE [DISTWIDTH] IN BLOOD BY AUTOMATED COUNT: 13.2 % (ref 11–15)
EST. AVERAGE GLUCOSE BLD GHB EST-MCNC: 105 MG/DL
EST. AVERAGE GLUCOSE BLD GHB EST-SCNC: 5.8 MMOL/L
GLUCOSE SERPL-MCNC: 85 MG/DL (ref 65–99)
HBA1C MFR BLD: 5.3 %
HCT VFR BLD AUTO: 39 % (ref 35–45)
HDLC SERPL-MCNC: 54 MG/DL
HGB BLD-MCNC: 12.4 G/DL (ref 11.7–15.5)
IGNF NEG CNTRL BLD: NORMAL
LDLC SERPL CALC-MCNC: 108 MG/DL (CALC)
LYMPHOCYTES # BLD AUTO: 2509 CELLS/UL (ref 850–3900)
LYMPHOCYTES NFR BLD AUTO: 33.9 %
M TB IFN-G BLD-IMP: NEGATIVE
MCH RBC QN AUTO: 30.5 PG (ref 27–33)
MCHC RBC AUTO-ENTMCNC: 31.8 G/DL (ref 32–36)
MCV RBC AUTO: 95.8 FL (ref 80–100)
MITOGEN IGNF.SPOT COUNT BLD: NORMAL
MONOCYTES # BLD AUTO: 474 CELLS/UL (ref 200–950)
MONOCYTES NFR BLD AUTO: 6.4 %
NEUTROPHILS # BLD AUTO: 4085 CELLS/UL (ref 1500–7800)
NEUTROPHILS NFR BLD AUTO: 55.2 %
NONHDLC SERPL-MCNC: 124 MG/DL (CALC)
PLATELET # BLD AUTO: 173 THOUSAND/UL (ref 140–400)
PMV BLD REES-ECKER: 11.3 FL (ref 7.5–12.5)
POTASSIUM SERPL-SCNC: 3.7 MMOL/L (ref 3.5–5.3)
PROT SERPL-MCNC: 7.1 G/DL (ref 6.1–8.1)
QUEST PANEL A SPOT COUNT: 0
QUEST PANEL B SPOT COUNT: 0
RBC # BLD AUTO: 4.07 MILLION/UL (ref 3.8–5.1)
SODIUM SERPL-SCNC: 138 MMOL/L (ref 135–146)
T PALLIDUM AB SER QL IA: NEGATIVE
TRIGL SERPL-MCNC: 71 MG/DL
TSH SERPL-ACNC: 1.9 MIU/L
WBC # BLD AUTO: 7.4 THOUSAND/UL (ref 3.8–10.8)

## 2025-06-16 ENCOUNTER — APPOINTMENT (OUTPATIENT)
Dept: PRIMARY CARE | Facility: CLINIC | Age: 20
End: 2025-06-16
Payer: COMMERCIAL

## 2025-07-09 ENCOUNTER — APPOINTMENT (OUTPATIENT)
Dept: PRIMARY CARE | Facility: CLINIC | Age: 20
End: 2025-07-09
Payer: COMMERCIAL

## 2025-07-09 VITALS
SYSTOLIC BLOOD PRESSURE: 120 MMHG | HEART RATE: 82 BPM | BODY MASS INDEX: 25.17 KG/M2 | DIASTOLIC BLOOD PRESSURE: 78 MMHG | OXYGEN SATURATION: 99 % | WEIGHT: 137.6 LBS

## 2025-07-09 DIAGNOSIS — F41.9 ANXIETY: ICD-10-CM

## 2025-07-09 PROCEDURE — 99213 OFFICE O/P EST LOW 20 MIN: CPT | Performed by: STUDENT IN AN ORGANIZED HEALTH CARE EDUCATION/TRAINING PROGRAM

## 2025-07-09 PROCEDURE — 1036F TOBACCO NON-USER: CPT | Performed by: STUDENT IN AN ORGANIZED HEALTH CARE EDUCATION/TRAINING PROGRAM

## 2025-07-09 RX ORDER — VENLAFAXINE HYDROCHLORIDE 75 MG/1
75 CAPSULE, EXTENDED RELEASE ORAL DAILY
Qty: 90 CAPSULE | Refills: 1 | Status: SHIPPED | OUTPATIENT
Start: 2025-07-09

## 2025-07-09 NOTE — PROGRESS NOTES
Subjective   Patient ID: Mei Pritchett is a 19 y.o. female who presents for Anxiety.    HPI   Patient is coming into the office today to follow-up for her continued anxiety/migraines    Ultimately she was placed on Effexor 37.5 mg and then slowly titrated up to now 75 mg daily    Continues to feel slightly better especially in regards to her anxiety  Lastly, notes that her migraines/headaches have also been getting lesser in quantity as well as lesser in severity    Feels well and wishes to continue medications    Review of Systems  All pertinent positive symptoms are included in the history of present illness.    All other systems have been reviewed and are negative and noncontributory to this patient's current ailments.  Objective   /78 (BP Location: Left arm, Patient Position: Sitting, BP Cuff Size: Adult)   Pulse 82   Wt 62.4 kg (137 lb 9.6 oz)   SpO2 99%   BMI 25.17 kg/m²     Physical Exam  CONSTITUTIONAL - well nourished, well developed, looks like stated age, in no acute distress, not ill-appearing, and not tired appearing  SKIN - normal skin color and pigmentation, normal skin turgor without rash, lesions, or nodules visualized  HEAD - no trauma, normocephalic  EYES - normal external exam  CHEST -no distressed breathing, good effort  EXTREMITIES - no edema, no deformities  NEUROLOGICAL - normal balance, normal motor, no ataxia  PSYCHIATRIC - alert, pleasant and cordial, age-appropriate    Assessment/Plan   I would recommend the continue medication without any changes    I am happy to hear that you are doing much better    Lastly, please follow-up with neurology at your scheduled appointment at the end of the month to be further evaluate/treated    Otherwise, good luck at school in the near future

## 2025-07-31 ENCOUNTER — APPOINTMENT (OUTPATIENT)
Dept: NEUROLOGY | Facility: CLINIC | Age: 20
End: 2025-07-31
Payer: COMMERCIAL

## 2025-07-31 VITALS
HEIGHT: 63 IN | HEART RATE: 71 BPM | DIASTOLIC BLOOD PRESSURE: 87 MMHG | BODY MASS INDEX: 22.32 KG/M2 | SYSTOLIC BLOOD PRESSURE: 129 MMHG | RESPIRATION RATE: 16 BRPM | WEIGHT: 126 LBS

## 2025-07-31 DIAGNOSIS — G43.009 MIGRAINE WITHOUT AURA AND WITHOUT STATUS MIGRAINOSUS, NOT INTRACTABLE: Primary | ICD-10-CM

## 2025-07-31 DIAGNOSIS — F43.9 STRESS: ICD-10-CM

## 2025-07-31 PROCEDURE — 3008F BODY MASS INDEX DOCD: CPT | Performed by: NURSE PRACTITIONER

## 2025-07-31 PROCEDURE — 99204 OFFICE O/P NEW MOD 45 MIN: CPT | Performed by: NURSE PRACTITIONER

## 2025-07-31 PROCEDURE — 1036F TOBACCO NON-USER: CPT | Performed by: NURSE PRACTITIONER

## 2025-07-31 RX ORDER — RIZATRIPTAN BENZOATE 10 MG/1
10 TABLET ORAL ONCE AS NEEDED
Qty: 9 TABLET | Refills: 0 | Status: SHIPPED | OUTPATIENT
Start: 2025-07-31 | End: 2025-08-30

## 2025-07-31 ASSESSMENT — LIFESTYLE VARIABLES
HOW OFTEN DO YOU HAVE A DRINK CONTAINING ALCOHOL: MONTHLY OR LESS
SKIP TO QUESTIONS 9-10: 1
AUDIT-C TOTAL SCORE: 1
HOW MANY STANDARD DRINKS CONTAINING ALCOHOL DO YOU HAVE ON A TYPICAL DAY: 1 OR 2
HOW OFTEN DO YOU HAVE SIX OR MORE DRINKS ON ONE OCCASION: NEVER

## 2025-07-31 ASSESSMENT — PATIENT HEALTH QUESTIONNAIRE - PHQ9
SUM OF ALL RESPONSES TO PHQ9 QUESTIONS 1 AND 2: 0
1. LITTLE INTEREST OR PLEASURE IN DOING THINGS: NOT AT ALL
2. FEELING DOWN, DEPRESSED OR HOPELESS: NOT AT ALL

## 2025-07-31 ASSESSMENT — PAIN SCALES - GENERAL: PAINLEVEL_OUTOF10: 3

## 2025-07-31 NOTE — PROGRESS NOTES
CHIEF COMPLAINT:  Headaches    HISTORY OF PRESENT ILLNESS:  19 year old presented to office to establish care for headaches, referred by pcp.  Headaches started around age 18 years old.  Was getting headaches daily, then was started on effexor - decreased.  1x/week,  mild pressure to frontal head and can surround entire head, duration 2-3 hours.  Associated sxs:  n/v, photophobia, phonophonia, no change in vision or auras.  Past tx: currently on effexor, ibuprofen, nurtec   Family history of migraines: maternal grandmother and uncle.    Triggers: stress, unaware of other triggers    Sleep: Goes to bed about 9-11p,  gets up 4 am-630 am depending on work hours,  will awaken up at night for no reason or sometimes with things on mind.  No snoring,  coughing or gasping self awake. occasional wakes up with ha,  feels tired during the day.  Occasional naps 20 min - 2 hours.      Feels stress is well controlled well controlled, denies thoughts or feelings of self harm, nor has a plan.    Caffeine intake: 1 monster energy drink,    Water intake: 40 ounces per day.  No alcohol, no tobacco, no illicit drug use.     All questions and concerns addressed.         Current Outpatient Medications on File Prior to Visit   Medication Sig Dispense Refill    albuterol (ProAir HFA) 90 mcg/actuation inhaler Inhale 2 puffs every 4 hours if needed for wheezing or shortness of breath. 8.5 g 1    venlafaxine XR (Effexor-XR) 75 mg 24 hr capsule Take 1 capsule (75 mg) by mouth once daily. Do not crush or chew. 90 capsule 1    hydrOXYzine pamoate (VistariL) 25 mg capsule Take 1 capsule (25 mg) by mouth 3 times a day as needed for itching for up to 10 days. 30 capsule 1    Nurtec ODT 75 mg tablet,disintegrating DISSOLVE 1 TABLET BY MOUTH EVERY OTHER DAY (Patient not taking: Reported on 7/31/2025) 15 tablet 0     No current facility-administered medications on file prior to visit.         Past Medical History:   Diagnosis Date    Headache   "        History reviewed. No pertinent surgical history.      No family history on file.      Social History     Tobacco Use    Smoking status: Never     Passive exposure: Past    Smokeless tobacco: Never   Substance Use Topics    Alcohol use: Yes     Alcohol/week: 0.0 - 1.0 standard drinks of alcohol         ALLERGIES:    Patient has no known allergies.      REVIEW OF SYSTEMS:  General:     Appetite change: denies. Chills: denies. Fever: denies.  Allergy/Immunology:     Unusual rection to medications, food, animals or insects reaction: denies.  Ophthalmologic:     Visual acuity change: denies.  ENT:     Decreased hearing: denies.  Endocrine:     Weight loss: denies.  Respiratory:     Cough: denies. Wheezing: denies.  Cardiovascular:     Chest pain: denies. Palpitations: denies.  Gastrointestinal:     Abdominal pain: denies. Difficulty swallowing: denies  Hematology:    Bleeding problems: denies.  Genitourinary:     Painful urination: denies.  Musculoskeletal:     Joint pain: denies. Joint edema: denies.  Skin:     Rash: denies.  Neurologic:     Ataxia: denies, Tremor: denies.  Psychiatric:     Suicidal thoughts: denies.    Also see HPI for elements of ROS documented therein and for details of positive findings, which shall supersede the foregoing.      OBJECTIVE:    Objective     Vitals:    07/31/25 1324   BP: 129/87   Pulse: 71   Resp: 16   Weight: 57.2 kg (126 lb)   Height: 1.6 m (5' 3\")       Body mass index is 22.32 kg/m².      EXAMINATION:  General Exam: pleasant, well nourished, well developed, in no acute distress  Head: normocephalic, atraumatic  Eyes: extraocular movement intact (EOMI), pupils equal, round, reactive to light, upper eyelids normal , lower eyelids normal  Ears: no obvious hearing deficit  Nose: Nares patent  Neck/Throat: neck supple, full range of motion  Oral Cavity: mucosa moist  Skin: warm and dry  Heart: no murmurs, regular rate and rhythm, S1, S2 normal  Lungs: clear to auscultation " bilaterally, good air movement, no wheezes, rales, rhonci, speaks in full sentences  Chest: normal shape and expansion  Abdomen: bowel sounds present, soft, nontender, nondistended, no guarding or rigidity  Extremities: no edema, no cyanosis  Musculoskeletal: no swelling or deformity  Neurologic: nonfocal, alert and oriented, cognitive exam grossly normal, cranial nerves 2-12 grossly intact, motor strength 5/5 bilateral symmetrically, no drift, coordination intact, sensory exam intact, gait normal  Psych: pleasant, cooperative, good eye contact, speech clear, judgement and insight good      ASSESSMENT/PLAN:  1. Migraine without aura and without status migrainosus, not intractable (Primary)  Start maxalt prn,   occurs at least 1x/week,  continue to follow up with pcp for management of stress- currently on effexor.   Adequate sleep,  sleep hygiene, decrease stress, increase activity/exercise.    - rizatriptan (Maxalt) 10 mg tablet; Take 1 tablet (10 mg) by mouth 1 time if needed for migraine (1 tablet daily as needed for onset of migraine, may repeat 2nd dose in 2 hrs if needed. max 2 doses in 24 hours.) for up to 9 doses.  Dispense: 9 tablet; Refill: 0    2. Stress  Defer to pcp      Follow up in 3 weeks      I personally spent  today, exclusive of procedures, providing care for this patient, including preparation, face to face time, documentation and other services such as review of medical records, diagnostic result, patient education, counseling, coordination of care as specified in the encounter.             Tahira Hill, APRN-CNP

## 2025-08-01 ENCOUNTER — TELEPHONE (OUTPATIENT)
Dept: NEUROLOGY | Facility: CLINIC | Age: 20
End: 2025-08-01
Payer: COMMERCIAL

## 2025-08-01 NOTE — TELEPHONE ENCOUNTER
Sandra's mom called [facility] for signed and asked if the venlafaxine XR mg 24 hr capsule  should be 75 mg or 100 mg?

## 2025-08-14 ENCOUNTER — APPOINTMENT (OUTPATIENT)
Dept: NEUROLOGY | Facility: CLINIC | Age: 20
End: 2025-08-14
Payer: COMMERCIAL

## 2025-08-14 DIAGNOSIS — F43.9 STRESS: ICD-10-CM

## 2025-08-14 DIAGNOSIS — G43.009 MIGRAINE WITHOUT AURA AND WITHOUT STATUS MIGRAINOSUS, NOT INTRACTABLE: Primary | ICD-10-CM

## 2025-08-14 PROCEDURE — 1036F TOBACCO NON-USER: CPT | Performed by: NURSE PRACTITIONER

## 2025-08-14 PROCEDURE — 99213 OFFICE O/P EST LOW 20 MIN: CPT | Performed by: NURSE PRACTITIONER

## 2025-08-14 RX ORDER — RIZATRIPTAN BENZOATE 10 MG/1
10 TABLET ORAL AS NEEDED
Qty: 12 TABLET | Refills: 5 | Status: SHIPPED | OUTPATIENT
Start: 2025-08-14 | End: 2025-09-13

## 2025-12-18 ENCOUNTER — APPOINTMENT (OUTPATIENT)
Dept: PRIMARY CARE | Facility: CLINIC | Age: 20
End: 2025-12-18
Payer: COMMERCIAL